# Patient Record
Sex: MALE
[De-identification: names, ages, dates, MRNs, and addresses within clinical notes are randomized per-mention and may not be internally consistent; named-entity substitution may affect disease eponyms.]

---

## 2021-08-24 ENCOUNTER — RX ONLY (RX ONLY)
Age: 31
End: 2021-08-24

## 2021-08-24 ENCOUNTER — NEW SKIN PROBLEM (OUTPATIENT)
Dept: URBAN - METROPOLITAN AREA CLINIC 9 | Facility: CLINIC | Age: 31
Setting detail: DERMATOLOGY
End: 2021-08-24

## 2021-08-24 DIAGNOSIS — Z85.828 PERSONAL HISTORY OF OTHER MALIGNANT NEOPLASM OF SKIN: ICD-10-CM

## 2021-08-24 DIAGNOSIS — L90.5 SCAR CONDITIONS AND FIBROSIS OF SKIN: ICD-10-CM

## 2021-08-24 PROCEDURE — 99203 OFFICE O/P NEW LOW 30 MIN: CPT

## 2021-08-24 PROCEDURE — 87220 TISSUE EXAM FOR FUNGI: CPT

## 2021-08-24 RX ORDER — GRISEOFULVIN 500 MG/1
1 TABLET TABLET ORAL ONCE A DAY
Qty: 30 | Refills: 1
Start: 2021-08-24

## 2021-08-24 RX ORDER — ECONAZOLE NITRATE 10 MG/G
A SMALL AMOUNT CREAM TOPICAL ONCE A DAY
Qty: 85 | Refills: 1
Start: 2021-08-24

## 2021-09-28 ENCOUNTER — FOLLOW-UP (OUTPATIENT)
Dept: URBAN - METROPOLITAN AREA CLINIC 9 | Facility: CLINIC | Age: 31
Setting detail: DERMATOLOGY
End: 2021-09-28

## 2021-09-28 DIAGNOSIS — D48.5 NEOPLASM OF UNCERTAIN BEHAVIOR OF SKIN: ICD-10-CM

## 2021-09-28 PROCEDURE — 99212 OFFICE O/P EST SF 10 MIN: CPT

## 2022-02-08 ENCOUNTER — RX ONLY (RX ONLY)
Age: 32
End: 2022-02-08

## 2022-02-08 ENCOUNTER — COMPLETE SKIN EXAM (OUTPATIENT)
Dept: URBAN - METROPOLITAN AREA CLINIC 9 | Facility: CLINIC | Age: 32
Setting detail: DERMATOLOGY
End: 2022-02-08

## 2022-02-08 DIAGNOSIS — B07.8 OTHER VIRAL WARTS: ICD-10-CM

## 2022-02-08 PROCEDURE — 99214 OFFICE O/P EST MOD 30 MIN: CPT

## 2022-02-08 RX ORDER — ECONAZOLE NITRATE 10 MG/G
A SMALL AMOUNT CREAM TOPICAL ONCE A DAY
Qty: 85 | Refills: 1
Start: 2022-02-08

## 2022-02-08 RX ORDER — ALUMINUM CHLORIDE 20 %
1 A SMALL AMOUNT SOLUTION, NON-ORAL TOPICAL EVERY NIGHT
Qty: 35 | Refills: 2
Start: 2022-02-08

## 2022-02-14 ENCOUNTER — RX ONLY (RX ONLY)
Age: 32
End: 2022-02-14

## 2022-02-14 RX ORDER — TERBINAFINE HYDROCHLORIDE 250 MG/1
1 TABLET TABLET ORAL ONCE A DAY
Qty: 30 | Refills: 2
Start: 2022-02-14

## 2022-03-17 ENCOUNTER — FOLLOW-UP (OUTPATIENT)
Dept: URBAN - METROPOLITAN AREA CLINIC 9 | Facility: CLINIC | Age: 32
Setting detail: DERMATOLOGY
End: 2022-03-17

## 2022-03-17 ENCOUNTER — RX ONLY (RX ONLY)
Age: 32
End: 2022-03-17

## 2022-03-17 DIAGNOSIS — L82.0 INFLAMED SEBORRHEIC KERATOSIS: ICD-10-CM

## 2022-03-17 PROCEDURE — 99214 OFFICE O/P EST MOD 30 MIN: CPT

## 2022-03-17 RX ORDER — TERBINAFINE HYDROCHLORIDE 250 MG/1
1 TABLET TABLET ORAL ONCE A DAY
Qty: 30 | Refills: 2
Start: 2022-03-17

## 2022-03-17 RX ORDER — CICLOPIROX 7.7 MG/G
1 A SMALL AMOUNT GEL TOPICAL EVERY NIGHT
Qty: 100 | Refills: 6
Start: 2022-03-17

## 2022-09-19 ENCOUNTER — APPOINTMENT (OUTPATIENT)
Dept: URBAN - METROPOLITAN AREA CLINIC 214 | Age: 32
Setting detail: DERMATOLOGY
End: 2022-09-19

## 2022-09-19 DIAGNOSIS — B35.1 TINEA UNGUIUM: ICD-10-CM

## 2022-09-19 DIAGNOSIS — L91.8 OTHER HYPERTROPHIC DISORDERS OF THE SKIN: ICD-10-CM

## 2022-09-19 PROCEDURE — OTHER COUNSELING: OTHER

## 2022-09-19 PROCEDURE — OTHER REASSURANCE: OTHER

## 2022-09-19 PROCEDURE — 99213 OFFICE O/P EST LOW 20 MIN: CPT

## 2022-09-19 PROCEDURE — OTHER TREATMENT REGIMEN: OTHER

## 2022-09-19 ASSESSMENT — LOCATION DETAILED DESCRIPTION DERM
LOCATION DETAILED: RIGHT 3RD TOE
LOCATION DETAILED: LEFT 4TH TOE
LOCATION DETAILED: RIGHT ANTERIOR MEDIAL PROXIMAL UPPER ARM
LOCATION DETAILED: LEFT 3RD TOE
LOCATION DETAILED: RIGHT 4TH TOE
LOCATION DETAILED: RIGHT 2ND TOE
LOCATION DETAILED: RIGHT 5TH TOE
LOCATION DETAILED: LEFT 2ND TOE
LOCATION DETAILED: LEFT GREAT TOE
LOCATION DETAILED: RIGHT GREAT TOE
LOCATION DETAILED: LEFT 5TH TOE

## 2022-09-19 ASSESSMENT — LOCATION ZONE DERM
LOCATION ZONE: ARM
LOCATION ZONE: TOE

## 2022-09-19 ASSESSMENT — LOCATION SIMPLE DESCRIPTION DERM
LOCATION SIMPLE: RIGHT FOOT
LOCATION SIMPLE: LEFT FOOT
LOCATION SIMPLE: RIGHT UPPER ARM

## 2022-09-19 NOTE — HPI: NAIL DISORDER
Is This A New Presentation, Or A Follow-Up?: Follow Up Nail Disorder
How Severe Is It?: mild
Additional History: s/p terbinafine for three months